# Patient Record
Sex: MALE | Race: WHITE | NOT HISPANIC OR LATINO | URBAN - METROPOLITAN AREA
[De-identification: names, ages, dates, MRNs, and addresses within clinical notes are randomized per-mention and may not be internally consistent; named-entity substitution may affect disease eponyms.]

---

## 2020-09-22 ENCOUNTER — OFFICE (OUTPATIENT)
Dept: URBAN - METROPOLITAN AREA CLINIC 34 | Facility: CLINIC | Age: 70
End: 2020-09-22
Payer: COMMERCIAL

## 2020-09-22 VITALS
DIASTOLIC BLOOD PRESSURE: 66 MMHG | HEIGHT: 70 IN | TEMPERATURE: 97 F | WEIGHT: 208 LBS | HEART RATE: 70 BPM | SYSTOLIC BLOOD PRESSURE: 145 MMHG

## 2020-09-22 DIAGNOSIS — Z86.010 PERSONAL HISTORY OF COLONIC POLYPS: ICD-10-CM

## 2020-09-22 DIAGNOSIS — I25.10 ATHEROSCLEROTIC HEART DISEASE OF NATIVE CORONARY ARTERY WITH: ICD-10-CM

## 2020-09-22 PROCEDURE — 99203 OFFICE O/P NEW LOW 30 MIN: CPT | Performed by: PHYSICIAN ASSISTANT

## 2020-09-22 NOTE — SERVICEHPINOTES
LEO REESE   is a   70   year old male who is being seen in consultation at the request of   EDEL SHCMITT   for discussion of colonoscopy. Patient has PMH CAD (s/p CABG in 2003), HTN, dyslipidemia, and PAD (s/p iliac artery stenting). He reports 2 prior colonoscopies with his last one being circa 2013 with 5-year f/u advised due to a precancerous polyp. He feels well and denies any GI complaints. He reports a normal daily BM. No constipation, diarrhea, or blood in stools. He notes that he just had 2 cardiac stents placed within the past couple of months. Prior to that, he had iliac artery stenting. He had checked with his vascular provider about stopping his Plavix for a dental procedure and was told ok, so he has not been taking this. He does take  mg. He feels well. No chest pain or SOB.